# Patient Record
Sex: MALE | Race: WHITE | HISPANIC OR LATINO | Employment: FULL TIME | URBAN - METROPOLITAN AREA
[De-identification: names, ages, dates, MRNs, and addresses within clinical notes are randomized per-mention and may not be internally consistent; named-entity substitution may affect disease eponyms.]

---

## 2023-04-09 ENCOUNTER — OFFICE VISIT (OUTPATIENT)
Dept: URGENT CARE | Facility: CLINIC | Age: 53
End: 2023-04-09
Payer: COMMERCIAL

## 2023-04-09 ENCOUNTER — HOSPITAL ENCOUNTER (OUTPATIENT)
Dept: RADIOLOGY | Facility: MEDICAL CENTER | Age: 53
End: 2023-04-09
Payer: COMMERCIAL

## 2023-04-09 VITALS
SYSTOLIC BLOOD PRESSURE: 114 MMHG | BODY MASS INDEX: 30.42 KG/M2 | RESPIRATION RATE: 20 BRPM | WEIGHT: 237 LBS | OXYGEN SATURATION: 97 % | DIASTOLIC BLOOD PRESSURE: 68 MMHG | HEIGHT: 74 IN | TEMPERATURE: 97.2 F | HEART RATE: 70 BPM

## 2023-04-09 DIAGNOSIS — L03.312 CELLULITIS OF BACK EXCEPT BUTTOCK: ICD-10-CM

## 2023-04-09 DIAGNOSIS — L02.91 ABSCESS: ICD-10-CM

## 2023-04-09 PROCEDURE — 10061 I&D ABSCESS COMP/MULTIPLE: CPT

## 2023-04-09 PROCEDURE — 99214 OFFICE O/P EST MOD 30 MIN: CPT | Mod: 25

## 2023-04-09 PROCEDURE — 76705 ECHO EXAM OF ABDOMEN: CPT

## 2023-04-09 RX ORDER — DOXYCYCLINE HYCLATE 100 MG
100 TABLET ORAL 2 TIMES DAILY
Qty: 10 TABLET | Refills: 0 | Status: SHIPPED | OUTPATIENT
Start: 2023-04-09 | End: 2023-04-14

## 2023-04-09 RX ORDER — ASPIRIN 81 MG/1
81 TABLET, CHEWABLE ORAL DAILY
COMMUNITY

## 2023-04-09 RX ORDER — LISINOPRIL 10 MG/1
10 TABLET ORAL
COMMUNITY
Start: 2023-02-06

## 2023-04-09 RX ORDER — EMPAGLIFLOZIN 10 MG/1
TABLET, FILM COATED ORAL
COMMUNITY
Start: 2023-03-06

## 2023-04-09 RX ORDER — ATORVASTATIN CALCIUM 40 MG/1
TABLET, FILM COATED ORAL
COMMUNITY
Start: 2023-03-20

## 2023-04-09 ASSESSMENT — ENCOUNTER SYMPTOMS
BACK PAIN: 0
FALLS: 0
FEVER: 0
NECK PAIN: 0
MYALGIAS: 0

## 2023-04-09 NOTE — PROCEDURES
I and D    Date/Time: 4/9/2023 3:31 PM  Performed by: MARVEL Car  Authorized by: MARVEL Car   Type: abscess  Body area: trunk  Location details: back  Anesthesia: digital block    Anesthesia:  Local Anesthetic: lidocaine 1% with epinephrine  Anesthetic total: 1.5 mL    Sedation:  Patient sedated: no    Scalpel size: 11  Incision type: single straight  Incision depth: dermal  Complexity: complex  Drainage: bloody  Drainage amount: scant  Wound treatment: 2 sutures.  Patient tolerance: patient tolerated the procedure well with no immediate complications  Comments: Unable to extract purulent material from abscess. Soft tissue ordered to determine complexity of abscess.

## 2023-04-09 NOTE — PROGRESS NOTES
"Subjective:     Oswald Malcolm is a 52 y.o. male who presents for Bump (Lt side of shoulder blade, x 1 week, tender, painful, 3rd time coming out: worsening)    Patient reports that he began feeling a bump on his left scapular region approximately one week ago. The problem has been gradually worsening. Pertinent negatives include no fever, rash, myalgias or neck pain. Denies trauma to the area.. Treatments tried: Topical analgesics with no relief. The patient reports that the pain is worsening and the cyst is growing in size. Family member, Nathen, is accompanying patient throughout his visit and is the .    Review of Systems   Constitutional:  Negative for fever.   Musculoskeletal:  Negative for back pain, falls, joint pain, myalgias and neck pain.   All other systems reviewed and are negative.    PMH: History reviewed. No pertinent past medical history.  ALLERGIES:   Allergies   Allergen Reactions    Sulfa Drugs      hives     SURGHX: History reviewed. No pertinent surgical history.  SOCHX:   Social History     Socioeconomic History    Marital status:    Tobacco Use    Smoking status: Never    Smokeless tobacco: Never   Vaping Use    Vaping Use: Never used   Substance and Sexual Activity    Alcohol use: Not Currently    Drug use: Never     FH: History reviewed. No pertinent family history.      Objective:   /68 (BP Location: Left arm, Patient Position: Sitting, BP Cuff Size: Adult)   Pulse 70   Temp 36.2 °C (97.2 °F) (Temporal)   Resp 20   Ht 1.88 m (6' 2\")   Wt 108 kg (237 lb)   SpO2 97%   BMI 30.43 kg/m²     Physical Exam  Vitals and nursing note reviewed.   Constitutional:       General: He is not in acute distress.     Appearance: Normal appearance. He is not ill-appearing.   HENT:      Head: Normocephalic and atraumatic.      Right Ear: External ear normal.      Nose: No congestion or rhinorrhea.      Mouth/Throat:      Mouth: Mucous membranes are moist.   Eyes:      Extraocular " Movements: Extraocular movements intact.      Pupils: Pupils are equal, round, and reactive to light.   Cardiovascular:      Rate and Rhythm: Normal rate and regular rhythm.   Pulmonary:      Effort: Pulmonary effort is normal.   Abdominal:      General: Abdomen is flat. There is no distension.   Musculoskeletal:         General: No swelling or tenderness.      Cervical back: Normal range of motion and neck supple.   Skin:     General: Skin is warm and dry.      Findings: Abscess, erythema and rash present. No ecchymosis or signs of injury.             Comments: Approximately 6 -7 cm cyst on left lateral shoulder    Neurological:      General: No focal deficit present.      Mental Status: He is alert and oriented to person, place, and time. Mental status is at baseline.   Psychiatric:         Mood and Affect: Mood normal.         Behavior: Behavior normal.         Thought Content: Thought content normal.         Judgment: Judgment normal.     Soft Tissue US of Back    FINDINGS:  Targeted ultrasound evaluation of the area of concern in the left scapulary demonstrates a probable complicated fluid collection with mobile debris measuring 6.3 x 1.7 x 4.7 cm. A cystic mass or necrotic tumor would seem less likely. There is peripheral   vascularity.     IMPRESSION:     1.  Palpable lump likely representing abscess based on history versus cystic mass or necrotic neoplasm.    Assessment/Plan:   Assessment      1. Abscess  - doxycycline (VIBRAMYCIN) 100 MG Tab; Take 1 Tablet by mouth 2 times a day for 5 days.  Dispense: 10 Tablet; Refill: 0  - US-ABDOMEN LTD (SOFT TISSUE); Future    2. Cellulitis of back except buttock  - doxycycline (VIBRAMYCIN) 100 MG Tab; Take 1 Tablet by mouth 2 times a day for 5 days.  Dispense: 10 Tablet; Refill: 0    I&D attempted in clinic without success as cyst is likely deep in the dermal layers. See procedure note.    Procedure: Laceration Repair  -Risks including bleeding, nerve damage,  infection, and poor cosmetic outcome discussed at length. Benefits and alternatives discussed.   -Sterile technique throughout  -Local anesthesia with 2% lidocaine  -Closed with #  -0 Nylon interrupted sutures with good wound approximation  -Polysporin and dressing placed  -Patient tolerated well  -Patient educated to return to clinic in 7 days for suture removal     Soft tissue ultrasound of the left scapular area ordered.  See above results.  Patient started on doxycycline twice daily for 7 days.  Patient to return to clinic in approximately 72 hours for wound recheck.  Wound care discussed with patient and family member. All questions answered. Patient verbalized understanding and is in agreement with this plan of care.     Differential diagnosis, natural history, and supportive care discussed. AVS handout given and reviewed with patient. Patient educated on red flags and when to seek treatment back in ED or UC.     I personally reviewed prior external notes and test results pertinent to today's visit.  I have independently reviewed and interpreted all diagnostics ordered during this urgent care visit.     This dictation has been created using voice recognition software. The accuracy of the dictation is limited by the abilities of the software. I expect there may be some errors of grammar and possibly content. I made every attempt to manually correct the errors within my dictation. However, errors related to voice recognition software may still exist and should be interpreted within the appropriate context.    This note was electronically signed by MARVEL Caldera

## 2023-04-17 ENCOUNTER — OFFICE VISIT (OUTPATIENT)
Dept: URGENT CARE | Facility: CLINIC | Age: 53
End: 2023-04-17
Payer: COMMERCIAL

## 2023-04-17 VITALS
TEMPERATURE: 97.2 F | BODY MASS INDEX: 30.42 KG/M2 | SYSTOLIC BLOOD PRESSURE: 124 MMHG | OXYGEN SATURATION: 97 % | DIASTOLIC BLOOD PRESSURE: 60 MMHG | WEIGHT: 237 LBS | HEIGHT: 74 IN | RESPIRATION RATE: 20 BRPM | HEART RATE: 90 BPM

## 2023-04-17 DIAGNOSIS — L02.91 ABSCESS: ICD-10-CM

## 2023-04-17 PROCEDURE — 99024 POSTOP FOLLOW-UP VISIT: CPT | Performed by: FAMILY MEDICINE

## 2023-04-17 RX ORDER — CLOPIDOGREL BISULFATE 75 MG/1
75 TABLET ORAL DAILY
COMMUNITY
Start: 2023-02-06

## 2023-04-17 RX ORDER — AMOXICILLIN AND CLAVULANATE POTASSIUM 875; 125 MG/1; MG/1
1 TABLET, FILM COATED ORAL 2 TIMES DAILY
Qty: 14 TABLET | Refills: 0 | Status: SHIPPED | OUTPATIENT
Start: 2023-04-17 | End: 2023-04-24

## 2023-04-17 NOTE — PROGRESS NOTES
"Subjective:   Oswald Malcolm is a 53 y.o. male who presents for Suture / Staple Removal (Lt upper side of shoulder blade, bump decreased in size, lv: 4/09/2023, here for a suture removal and Ultrasound results )      Suture / Staple Removal      ROS    Medications, Allergies, and current problem list reviewed today in Epic.     Objective:     /60 (BP Location: Left arm, Patient Position: Sitting, BP Cuff Size: Adult)   Pulse 90   Temp 36.2 °C (97.2 °F) (Temporal)   Resp 20   Ht 1.88 m (6' 2\")   Wt 108 kg (237 lb)   SpO2 97%     Physical Exam    Assessment/Plan:     Diagnosis and associated orders:     1. Abscess  amoxicillin-clavulanate (AUGMENTIN) 875-125 MG Tab         Comments/MDM:     U/s appears to be abscess  Sutures removed         Differential diagnosis, natural history, supportive care, and indications for immediate follow-up discussed.    Advised the patient to follow-up with the primary care physician for recheck, reevaluation, and consideration of further management.    Please note that this dictation was created using voice recognition software. I have made a reasonable attempt to correct obvious errors, but I expect that there are errors of grammar and possibly content that I did not discover before finalizing the note.    This note was electronically signed by Phan Steel M.D.  "